# Patient Record
Sex: FEMALE | Race: WHITE
[De-identification: names, ages, dates, MRNs, and addresses within clinical notes are randomized per-mention and may not be internally consistent; named-entity substitution may affect disease eponyms.]

---
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
## 2023-06-08 ENCOUNTER — HOSPITAL ENCOUNTER (OUTPATIENT)
Dept: HOSPITAL 75 - RAD | Age: 18
End: 2023-06-08
Attending: FAMILY MEDICINE
Payer: COMMERCIAL

## 2023-06-08 DIAGNOSIS — M25.512: Primary | ICD-10-CM

## 2023-06-08 PROCEDURE — 73030 X-RAY EXAM OF SHOULDER: CPT

## 2023-06-08 NOTE — DIAGNOSTIC IMAGING REPORT
INDICATION: Left shoulder pain



Three views of the left shoulder show no fracture, dislocation or

other acute abnormalities.



IMPRESSION: Negative left shoulder. 



Dictated by: 



  Dictated on workstation # [id number]